# Patient Record
Sex: FEMALE | Race: OTHER | HISPANIC OR LATINO | URBAN - METROPOLITAN AREA
[De-identification: names, ages, dates, MRNs, and addresses within clinical notes are randomized per-mention and may not be internally consistent; named-entity substitution may affect disease eponyms.]

---

## 2022-11-24 ENCOUNTER — EMERGENCY (EMERGENCY)
Facility: HOSPITAL | Age: 42
LOS: 0 days | Discharge: HOME | End: 2022-11-24
Attending: EMERGENCY MEDICINE | Admitting: EMERGENCY MEDICINE
Payer: SELF-PAY

## 2022-11-24 VITALS
DIASTOLIC BLOOD PRESSURE: 75 MMHG | TEMPERATURE: 98 F | SYSTOLIC BLOOD PRESSURE: 132 MMHG | RESPIRATION RATE: 14 BRPM | WEIGHT: 153 LBS | HEART RATE: 73 BPM | OXYGEN SATURATION: 98 %

## 2022-11-24 VITALS
HEART RATE: 80 BPM | DIASTOLIC BLOOD PRESSURE: 71 MMHG | SYSTOLIC BLOOD PRESSURE: 128 MMHG | RESPIRATION RATE: 16 BRPM | OXYGEN SATURATION: 98 %

## 2022-11-24 DIAGNOSIS — R07.89 OTHER CHEST PAIN: ICD-10-CM

## 2022-11-24 LAB
ALBUMIN SERPL ELPH-MCNC: 4.6 G/DL — SIGNIFICANT CHANGE UP (ref 3.5–5.2)
ALP SERPL-CCNC: 74 U/L — SIGNIFICANT CHANGE UP (ref 30–115)
ALT FLD-CCNC: 10 U/L — SIGNIFICANT CHANGE UP (ref 0–41)
ANION GAP SERPL CALC-SCNC: 11 MMOL/L — SIGNIFICANT CHANGE UP (ref 7–14)
AST SERPL-CCNC: 15 U/L — SIGNIFICANT CHANGE UP (ref 0–41)
BASOPHILS # BLD AUTO: 0.04 K/UL — SIGNIFICANT CHANGE UP (ref 0–0.2)
BASOPHILS NFR BLD AUTO: 0.5 % — SIGNIFICANT CHANGE UP (ref 0–1)
BILIRUB SERPL-MCNC: 0.4 MG/DL — SIGNIFICANT CHANGE UP (ref 0.2–1.2)
BUN SERPL-MCNC: 10 MG/DL — SIGNIFICANT CHANGE UP (ref 10–20)
CALCIUM SERPL-MCNC: 9 MG/DL — SIGNIFICANT CHANGE UP (ref 8.4–10.5)
CHLORIDE SERPL-SCNC: 105 MMOL/L — SIGNIFICANT CHANGE UP (ref 98–110)
CO2 SERPL-SCNC: 22 MMOL/L — SIGNIFICANT CHANGE UP (ref 17–32)
CREAT SERPL-MCNC: 0.7 MG/DL — SIGNIFICANT CHANGE UP (ref 0.7–1.5)
EGFR: 113 ML/MIN/1.73M2 — SIGNIFICANT CHANGE UP
EOSINOPHIL # BLD AUTO: 0.09 K/UL — SIGNIFICANT CHANGE UP (ref 0–0.7)
EOSINOPHIL NFR BLD AUTO: 1.1 % — SIGNIFICANT CHANGE UP (ref 0–8)
GLUCOSE SERPL-MCNC: 92 MG/DL — SIGNIFICANT CHANGE UP (ref 70–99)
HCG SERPL QL: NEGATIVE — SIGNIFICANT CHANGE UP
HCT VFR BLD CALC: 39.6 % — SIGNIFICANT CHANGE UP (ref 37–47)
HGB BLD-MCNC: 12.4 G/DL — SIGNIFICANT CHANGE UP (ref 12–16)
IMM GRANULOCYTES NFR BLD AUTO: 0.3 % — SIGNIFICANT CHANGE UP (ref 0.1–0.3)
LYMPHOCYTES # BLD AUTO: 2.11 K/UL — SIGNIFICANT CHANGE UP (ref 1.2–3.4)
LYMPHOCYTES # BLD AUTO: 26.8 % — SIGNIFICANT CHANGE UP (ref 20.5–51.1)
MCHC RBC-ENTMCNC: 24.8 PG — LOW (ref 27–31)
MCHC RBC-ENTMCNC: 31.3 G/DL — LOW (ref 32–37)
MCV RBC AUTO: 79.4 FL — LOW (ref 81–99)
MONOCYTES # BLD AUTO: 0.75 K/UL — HIGH (ref 0.1–0.6)
MONOCYTES NFR BLD AUTO: 9.5 % — HIGH (ref 1.7–9.3)
NEUTROPHILS # BLD AUTO: 4.85 K/UL — SIGNIFICANT CHANGE UP (ref 1.4–6.5)
NEUTROPHILS NFR BLD AUTO: 61.8 % — SIGNIFICANT CHANGE UP (ref 42.2–75.2)
NRBC # BLD: 0 /100 WBCS — SIGNIFICANT CHANGE UP (ref 0–0)
PLATELET # BLD AUTO: 200 K/UL — SIGNIFICANT CHANGE UP (ref 130–400)
POTASSIUM SERPL-MCNC: 4.4 MMOL/L — SIGNIFICANT CHANGE UP (ref 3.5–5)
POTASSIUM SERPL-SCNC: 4.4 MMOL/L — SIGNIFICANT CHANGE UP (ref 3.5–5)
PROT SERPL-MCNC: 7.4 G/DL — SIGNIFICANT CHANGE UP (ref 6–8)
RBC # BLD: 4.99 M/UL — SIGNIFICANT CHANGE UP (ref 4.2–5.4)
RBC # FLD: 14.6 % — HIGH (ref 11.5–14.5)
SODIUM SERPL-SCNC: 138 MMOL/L — SIGNIFICANT CHANGE UP (ref 135–146)
TROPONIN T SERPL-MCNC: <0.01 NG/ML — SIGNIFICANT CHANGE UP
WBC # BLD: 7.86 K/UL — SIGNIFICANT CHANGE UP (ref 4.8–10.8)
WBC # FLD AUTO: 7.86 K/UL — SIGNIFICANT CHANGE UP (ref 4.8–10.8)

## 2022-11-24 PROCEDURE — 99285 EMERGENCY DEPT VISIT HI MDM: CPT

## 2022-11-24 PROCEDURE — 93010 ELECTROCARDIOGRAM REPORT: CPT

## 2022-11-24 PROCEDURE — 71045 X-RAY EXAM CHEST 1 VIEW: CPT | Mod: 26

## 2022-11-24 RX ORDER — KETOROLAC TROMETHAMINE 30 MG/ML
15 SYRINGE (ML) INJECTION ONCE
Refills: 0 | Status: DISCONTINUED | OUTPATIENT
Start: 2022-11-24 | End: 2022-11-24

## 2022-11-24 RX ADMIN — Medication 15 MILLIGRAM(S): at 12:45

## 2022-11-24 NOTE — ED PROVIDER NOTE - PATIENT PORTAL LINK FT
You can access the FollowMyHealth Patient Portal offered by [facility] by registering at the following website: [email or web address]. By joining [facility]’s FollowMyHealth portal, you will also be able to view your health information using other applications (apps) compatible with our system.

## 2022-11-24 NOTE — ED PROVIDER NOTE - CLINICAL SUMMARY MEDICAL DECISION MAKING FREE TEXT BOX
41-year-old female presents to the ED for chest pain.  Made better and worse with movement.  Denies any shortness of breath or radiating pain.  Denies any past medical history.  Non-smoker.  Physical exam with mild tenderness to palpation of the left side of the chest.  Vitals reviewed by me noted to be wnl.  PERC negative lung sounds clear.  We obtained labs and chest x-ray.  Labs reviewed by me, values noted to be within normal limits.  Troponin negative.  ED Chest X-Ray reviewed by me which did not reveal a ptx, infiltrate, or effusion.  Given Toradol with moderate relief of pain.  Discharged home.  Return precautions given.

## 2022-11-24 NOTE — ED PROVIDER NOTE - OBJECTIVE STATEMENT
41-year-old female with no past medical history presenting with left-sided chest pain pressure-like.  Pain is worse with movement and better with rest.  No recent travel, recent surgery, leg swelling, calf pain, or hormonal use. No sob, fever, chills, abdominal pain, nausea, vomiting, diarrhea, back pain, urinary symptoms, headache, dizziness, paresthesias, or weakness.

## 2022-11-24 NOTE — ED PROVIDER NOTE - NS ED ATTENDING STATEMENT MOD
This was a shared visit with the HARVEY. I reviewed and verified the documentation and independently performed the documented:

## 2022-11-24 NOTE — ED ADULT NURSE NOTE - OBJECTIVE STATEMENT
Was the patient seen in the last year in this department? Yes     Does patient have an active prescription for medications requested? No     Received Request Via: Pharmacy   pt presents to the ED c/o CP since last night. pt denies sob, n/v/d

## 2023-05-01 ENCOUNTER — EMERGENCY (EMERGENCY)
Facility: HOSPITAL | Age: 43
LOS: 0 days | Discharge: HOME | End: 2023-05-01
Attending: EMERGENCY MEDICINE
Payer: MEDICAID

## 2023-05-01 VITALS
HEIGHT: 64 IN | RESPIRATION RATE: 18 BRPM | DIASTOLIC BLOOD PRESSURE: 80 MMHG | OXYGEN SATURATION: 99 % | HEART RATE: 72 BPM | TEMPERATURE: 98 F | SYSTOLIC BLOOD PRESSURE: 141 MMHG | WEIGHT: 151.02 LBS

## 2023-05-01 VITALS
OXYGEN SATURATION: 100 % | SYSTOLIC BLOOD PRESSURE: 106 MMHG | RESPIRATION RATE: 18 BRPM | DIASTOLIC BLOOD PRESSURE: 71 MMHG | TEMPERATURE: 97 F | HEART RATE: 74 BPM

## 2023-05-01 DIAGNOSIS — M79.661 PAIN IN RIGHT LOWER LEG: ICD-10-CM

## 2023-05-01 PROBLEM — Z78.9 OTHER SPECIFIED HEALTH STATUS: Chronic | Status: ACTIVE | Noted: 2022-11-24

## 2023-05-01 PROCEDURE — 99284 EMERGENCY DEPT VISIT MOD MDM: CPT | Mod: 25

## 2023-05-01 PROCEDURE — 99284 EMERGENCY DEPT VISIT MOD MDM: CPT

## 2023-05-01 PROCEDURE — 93970 EXTREMITY STUDY: CPT

## 2023-05-01 PROCEDURE — 93970 EXTREMITY STUDY: CPT | Mod: 26

## 2023-05-01 RX ORDER — IBUPROFEN 200 MG
600 TABLET ORAL ONCE
Refills: 0 | Status: COMPLETED | OUTPATIENT
Start: 2023-05-01 | End: 2023-05-01

## 2023-05-01 RX ADMIN — Medication 600 MILLIGRAM(S): at 14:27

## 2023-05-01 NOTE — ED PROVIDER NOTE - OBJECTIVE STATEMENT
42-year-old female no past medical history coming in here for right calf pain for the past 2 days.  Patient went to urgent care and was told to come here for rule out DVT.  Patient has no history of DVTs not on any OCPs no other risk factors no recent travel.  No other complaints 42-year-old female no past medical history coming in here for right calf pain for the past 2 days.  Patient went to urgent care and was told to come here for for further eval Patient has no history of DVTs, no erythema, fevers,  not on any OCPs no other risk factors no recent travel.  No other complaints

## 2023-05-01 NOTE — ED PROVIDER NOTE - CLINICAL SUMMARY MEDICAL DECISION MAKING FREE TEXT BOX
Likely neuromuscular etiology, unlikely infectious or vascular etiology, recommend supportive care measures including RICE, outpt f/u next available appt for further evaluation and management.    They were given detailed return precautions and advised to return to the emergency department if any new symptoms developed, symptoms worsened or for any concerns. They were offered the opportunity to ask questions and verbalized that they understand the diagnosis and discharge instructions.

## 2023-05-01 NOTE — ED PROVIDER NOTE - PATIENT PORTAL LINK FT
You can access the FollowMyHealth Patient Portal offered by [facility] by registering at the following website: [email or web address]. By joining [employer]’s FollowMyHealth portal, you will also be able to view your health information using other applications (apps) compatible with our system.

## 2023-05-01 NOTE — ED PROVIDER NOTE - PHYSICAL EXAMINATION
CONSTITUTIONAL:  in no acute distress.   SKIN: warm, dry  HEAD: Normocephalic; atraumatic.  EYES: PERRL, EOMI, normal sclera and conjunctiva   ENT: No nasal discharge; airway clear.  NECK: Supple; non tender.  CARD:  Regular rate and rhythm.   RESP: NO inc WOB   ABD: soft ntnd  EXT: Normal ROM.  tenderness to right popliteal region  LYMPH: No acute cervical adenopathy.  NEURO: Alert, oriented, grossly unremarkable  PSYCH: Cooperative, appropriate. CONSTITUTIONAL:  in no acute distress.   SKIN: warm, dry  HEAD: Normocephalic; atraumatic.  EYES: PERRL, EOMI, normal sclera and conjunctiva   ENT: No nasal discharge; airway clear.  NECK: Supple; non tender.  CARD:  Regular rate and rhythm.   RESP: NO inc WOB   ABD: soft ntnd  EXT: Normal ROM.  tenderness to right popliteal region, no erythema  LYMPH: No acute cervical adenopathy.  NEURO: Alert, oriented, grossly unremarkable  PSYCH: Cooperative, appropriate.

## 2023-05-01 NOTE — ED ADULT NURSE NOTE - OBJECTIVE STATEMENT
Pt reports to ed c/o RLE pain & swelling. Pt reports pain going from behind ankle to behind knee. Slight swelling noted. Sent in by  to r/o DVT

## 2023-05-01 NOTE — ED PROVIDER NOTE - ATTENDING CONTRIBUTION TO CARE
-year-old female no significant PMH presents with right leg pain for the past 2 days, sx are constant, worse with certain movements and position, better with rest, denies fever, tactile temp, chills, paresthesias, focal weakness, or other associated complaints at present. Pt denies recent heavy lifting or trauma. Old chart reviewed. I have reviewed and agree with the initial nursing note, except as documented in my note.    VSS, awake, alert, no skin lacerations or abrasions, RLE: No pelvic, hip, knee, tib-fib, ankle or foot point tenderness, diffuse ttp calf, ankle, foot, appears symmetrical, no gross deformity, no lacerations or abrasions, no skin breakdown, no crepitus, swelling, warmth, erythema, induration, fluctuance, lymphangitis, purulence or lesions, active ROM and passive ROM intact, no joint laxity appreciated, motor and sensation intact distally, 5/5 strength, NV intact distally with 2+ DP pulses, compartments non-tense, pain not out of proportion to exam not appreciated, normal gait.